# Patient Record
(demographics unavailable — no encounter records)

---

## 2024-12-11 NOTE — REVIEW OF SYSTEMS
[Recent Weight Gain (___ Lbs)] : recent [unfilled] ~Ulb weight gain [Dry Eyes] : dryness of the eyes [As Noted in HPI] : as noted in HPI [Joint Pain] : joint pain [Negative] : Heme/Lymph

## 2024-12-15 NOTE — CONSULT LETTER
[Dear  ___] : Dear  [unfilled], [Consult Letter:] : I had the pleasure of evaluating your patient, [unfilled]. [Please see my note below.] : Please see my note below. [Consult Closing:] : Thank you very much for allowing me to participate in the care of this patient.  If you have any questions, please do not hesitate to contact me. [Sincerely,] : Sincerely, [DrBrigitte  ___] : Dr. YATES [FreeTextEntry3] : Td\par  Myron I. Kleiner, M.D., FACR\par  Chief, Division of Rheumatology\par  Department of Medicine\par  [facility]

## 2024-12-15 NOTE — HISTORY OF PRESENT ILLNESS
[FreeTextEntry1] : NITIN RAMAN is a 55 year old woman who presents for follow up office visit for further evaluation of joint symptoms and rheumatic diseases including rheumatoid arthritis, osteoarthritis, Sjogren's syndrome, and remote history of SLE.  Denies recent joint pain. Last night transient pain right shoulder - resolved. Denies recent Morning stiffness. Some recent dry eyes and dry mouth. Using artificial tears, Biotene mouthwash, and oral hydration with adequate relief. Pt did not restart Celebrex. [facility] oncology discontinued Actemra (last: August 15th, 2024) q.4 weeks, folic acid, Prophylactic aspirin 81 mg and multivitamins. Patient continues calcium and vitamin D supplements and monthly risedronate without side effect or complications. Patient denies rash or side effects with their medications. Pt performed lab tests results yesterday pending results.   [facility] Breast cancer - chemotherapy--Taxol--- also immunotherapy with Keytruda x 3 months--- thrombocytopenia platelets 86,000,--after 2 more months of chemotherapy we will then consider  surgery to remove the breast mass in March 2025  Weight loss 20 lbs  Much fatigue

## 2024-12-15 NOTE — ADDENDUM
[FreeTextEntry1] :  I, Alejandro Levine, acted solely as a scribe for Dr. Myron I. Kleiner, MD. on 12/11/2024. I personally performed the services described in the documentation, reviewed the documentation recorded by the scribe in my presence, and it accurately and completely records my words and actions.

## 2024-12-15 NOTE — HISTORY OF PRESENT ILLNESS
[FreeTextEntry1] : NITIN RAMAN is a 55 year old woman who presents for follow up office visit for further evaluation of joint symptoms and rheumatic diseases including rheumatoid arthritis, osteoarthritis, Sjogren's syndrome, and remote history of SLE.  Denies recent joint pain. Last night transient pain right shoulder - resolved. Denies recent Morning stiffness. Some recent dry eyes and dry mouth. Using artificial tears, Biotene mouthwash, and oral hydration with adequate relief. Pt did not restart Celebrex. [facility] [place]ology discontinued Actemra (last: August 15th, 2024) q.4 weeks, folic acid, Prophylactic aspirin 81 mg and multivitamins. Patient continues calcium and vitamin D supplements and monthly risedronate without side effect or complications. Patient denies rash or side effects with their medications. Pt performed lab tests results yesterday pending results.   [facility] Breast cancer - chemotherapy--Taxol--- also immunotherapy with Keytruda x 3 months--- thrombocytopenia platelets 86,000,--after 2 more months of chemotherapy we will then consider  surgery to remove the breast mass in March 2025  Weight loss 20 lbs  Much fatigue

## 2024-12-15 NOTE — ASSESSMENT
[FreeTextEntry1] : Impression: NITIN RAMAN is a 55 year old woman who presents for follow up office visit for further evaluation of joint symptoms and rheumatic diseases including rheumatoid arthritis, osteoarthritis, Sjogren's syndrome, remote history of SLE.  Denies recent joint pain. Last night transient pain right shoulder Secondary to osteoarthritis and right bicipital tendonitis as seen on exam - resolved. Her rheumatoid arthritis remains quiescent with no evidence of active synovitis on exam today. Denies recent Morning stiffness. From previous physical exam ? mild bilateral synovial proliferation wrist - resolved. On exam pt has flexor tenosynovitis right 3rd finger and right bicipital tendonitis contributing to her joint pain. Some recent dry eyes and dry mouth - on exam pt has dry eyes and tongue dry Secondary to Sjogren Syndrome. Using artificial tears, Biotene mouthwash, and oral hydration with adequate relief. Pt did not restart Celebrex as per instruction from her oncologist. Recent lab tests results from December 6th, 2024 revealed WBC 2.6, hemoglobin 9.7, platelets 86,000 -- with extensive discussion - pt showed me on her cell phone during the office visit. Patient continues calcium and vitamin D supplements and monthly risedronate for general bone protection while on prednisone. [facility] Discontinued Actemra (last: August 15th, 2024) q.4 weeks, and folic acid, and Celebrex/prophylactic aspirin, multivitamin. Patient denies rash or side effects with their medications. Pt performed lab tests results yesterday pending results. Of note, hopefully the chemotherapy for breast cancer will continue to control her joint inflammation secondary to rheumatoid arthritis, as it is doing at this time..  Plan: I reviewed chart and previous records I reviewed previous lab results with the patient with extensive discussion Laboratory tests ordered  see list below  with coordination of care Obtain recent lab tests results  Diagnosis and prognosis discussed Continue current medications (other than those changed below) Patient declines any changes or additions to medication regimen. Artificial tears one drop each eye q.i.d. and p.r.n.(Possible side effects explained) Biotene mouthwash/spray q.i.d. and p.r.n.(Possible side effects explained) Gen-Teal "severe" 1 drop each eye h.s and also 4 times daily and as needed if she can tolerate it (Possible side effects explained) Oral Hydration I request recent PET scan results to be sent to me -- with extensive discussion Return visit 2 months All questions and concerns were addressed.

## 2024-12-15 NOTE — CONSULT LETTER
[Dear  ___] : Dear  [unfilled], [Consult Letter:] : I had the pleasure of evaluating your patient, [unfilled]. [Please see my note below.] : Please see my note below. [Consult Closing:] : Thank you very much for allowing me to participate in the care of this patient.  If you have any questions, please do not hesitate to contact me. [Sincerely,] : Sincerely, [DrBrigitte  ___] : Dr. YATES [FreeTextEntry3] : Td\par  Myron I. Kleiner, M.D., FACR\par  Chief, Division of Rheumatology\par  Department of Medicine\par  [facility]

## 2024-12-15 NOTE — PHYSICAL EXAM
[General Appearance - Alert] : alert [General Appearance - In No Acute Distress] : in no acute distress [General Appearance - Well Nourished] : well nourished [General Appearance - Well Developed] : well developed [General Appearance - Well-Appearing] : healthy appearing [] : normal voice and communication [Sclera] : the sclera and conjunctiva were normal [PERRL With Normal Accommodation] : pupils were equal in size, round, and reactive to light [Extraocular Movements] : extraocular movements were intact [Outer Ear] : the ears and nose were normal in appearance [Oropharynx] : the oropharynx was normal [Neck Appearance] : the appearance of the neck was normal [Neck Cervical Mass (___cm)] : no neck mass was observed [Jugular Venous Distention Increased] : there was no jugular-venous distention [Thyroid Diffuse Enlargement] : the thyroid was not enlarged [Cervical Lymph Nodes Enlarged Posterior Bilaterally] : posterior cervical [Cervical Lymph Nodes Enlarged Anterior Bilaterally] : anterior cervical [Supraclavicular Lymph Nodes Enlarged Bilaterally] : supraclavicular [Axillary Lymph Nodes Enlarged Bilaterally] : axillary [No CVA Tenderness] : no ~M costovertebral angle tenderness [No Spinal Tenderness] : no spinal tenderness [FreeTextEntry1] : Shoulders - tender right bicipital tendons Elbows/Wrists - normal  Hands- early Heberden's nodes, flexor tenosynovitis right 3rd finger, right decreased fist ability,  Hips- bilateral decreased external rotation Knees- bilateral crepitus Ankles- normal Feet-nontender bony enlargement bilateral 1st MTPs

## 2025-02-12 NOTE — HISTORY OF PRESENT ILLNESS
[FreeTextEntry1] : NITIN RAMAN is a 55 year old woman who presents for follow up office visit for further evaluation of joint symptoms and rheumatic diseases including rheumatoid arthritis, osteoarthritis, Sjogren's syndrome, and remote history of SLE.  Patient feels fairly well. Denies recent Morning stiffness. Denies recent joint pain.  She has recent dry eyes and dry mouth which is bothersome. Using artificial tears, Biotene mouthwash, and oral hydration without adequate relief. [facility] oncology discontinued Actemra (last: August 15th, 2024) q.4 weeks, folic acid, Prophylactic aspirin 81 mg and multivitamins. Patient continues calcium and vitamin D supplements and monthly risedronate without side effect or complications. Patient denies rash or side effects with their medications.   [facility] Breast cancer - chemotherapy and immunotherapy

## 2025-02-12 NOTE — ADDENDUM
[FreeTextEntry1] :  I, Alejandro Levine, acted solely as a scribe for Dr. Myron I. Kleiner, MD. on 02/11/2025. I personally performed the services described in the documentation, reviewed the documentation recorded by the scribe in my presence, and it accurately and completely records my words and actions.

## 2025-02-12 NOTE — CONSULT LETTER
[Dear  ___] : Dear  [unfilled], [Consult Letter:] : I had the pleasure of evaluating your patient, [unfilled]. [Please see my note below.] : Please see my note below. [Consult Closing:] : Thank you very much for allowing me to participate in the care of this patient.  If you have any questions, please do not hesitate to contact me. [Sincerely,] : Sincerely, [DrBrigitte  ___] : Dr. YATES [FreeTextEntry3] : Td\par  Myron I. Kleiner, M.D., FACR\par  Chief, Division of Rheumatology\par  Department of Medicine\par  [facility]

## 2025-02-12 NOTE — ASSESSMENT
[FreeTextEntry1] : Impression: NITIN RAMAN is a 55 year old woman who presents for follow up office visit for further evaluation of joint symptoms and rheumatic diseases including osteoarthritis, rheumatoid arthritis, Sjogren's syndrome, remote history of SLE.  Patient feels fairly well. Denies recent Morning stiffness. Denies recent joint pain with her osteoarthritis quiescent. Her rheumatoid arthritis remains quiescent with no evidence of active synovitis on exam today. From previous physical exam right bicipital tendonitis - resolved. On exam pt has flexor tenosynovitis right 3rd finger contributing to her joint pain. Some recent dry eyes and dry mouth - on exam pt has dry eyes and tongue slightly moist Secondary to Sjogren Syndrome. Using artificial tears, Biotene mouthwash, and oral hydration without adequate relief. Recent lab tests results revealed + Anti-Ro/La antibody, WBC 2.01, hemoglobin 10.2, platelets 72,000. RF 80 (0-13), Anti-, otherwise unrevealing-- with extensive discussion. Patient continues calcium and vitamin D supplements and monthly risedronate for general bone protection while on prednisone. [facility] had discontinued Actemra (last: August 15th, 2024) q.4 weeks, folic acid, prophylactic aspirin and multivitamin. Patient denies rash or side effects with their medications. Of note, hopefully the chemotherapy for breast cancer will continue to control her joint inflammation secondary to rheumatoid arthritis, as it is doing at this time..  Plan: I reviewed chart and previous records I reviewed previous lab results with the patient with extensive discussion Laboratory tests ordered  see list below  with coordination of care Obtain recent lab tests results  Diagnosis and prognosis discussed Continue current medications (other than those changed below) If ok with oncology start Pilocarpine 5 mg b.i.d.; if no better/no side effects 7 days, increase t.i.d.(Possible side effects explained)--- extensive discussion Artificial tears one drop each eye q.i.d. and p.r.n.(Possible side effects explained) Biotene mouthwash/spray q.i.d. and p.r.n.(Possible side effects explained) BioXtra mouthwash/spray q.i.d. and p.r.n. (Possible side effects explained) Gen-Teal "severe" 1 drop each eye h.s and also 4 times daily and as needed if she can tolerate it (Possible side effects explained) Oral Hydration Follow-up with oncology regarding her leukopenia and thrombocytopenia--extensive discussion Return visit 2 months All questions and concerns were addressed. Total time for this office visit, including face-to-face time and non-face-to-face time, 71 minutes--- including review of the chart and previous records, detailed review of her medical history, review of previous lab results with extensive discussion with the patient, ordering lab tests with coordination of care, review of previous imaging reports/x-ray results, detailed medication history, review of medications going forward with their possible side effects, reviewed modalities for treatment of her dryness with extensive discussion

## 2025-04-05 NOTE — HISTORY OF PRESENT ILLNESS
[FreeTextEntry1] : NITIN RAMAN is a 55 year old woman who presents for follow up office visit for further evaluation of joint symptoms and rheumatic diseases including rheumatoid arthritis, osteoarthritis, Sjogren's syndrome, and remote history of SLE. Accompanied by her  Tan   Denies recent joint pain. Denies recent morning stiffness. Pt notes recent very dry eyes and dry mouth using artificial tears, biotene mouthwash, oral hydration, and Pilocarpine 5mg b.i.d without relief.  Patient has been having blurry vision which ophthalmology diagnosed secondary to her very dry eyes.  Ophthalmology plugged bilateral lacrimal tear ducts and treated with topical ophthalmic antibiotic for corneal abrasion and different artificial tears including xiidra using artificial tears.  She has used GenTeal artificial tears without relief.  Patient continues calcium and vitamin D supplements and monthly risedronate without side effect or complications.  Patient denies rash or side effects with current medications. Patient is content with current medication regimen.   [facility]  Breast cancer--completed chemotherapy; April 22nd pt to have left mastectomy; depending on the findings at surgery, she may also then get RT; but she is continuing Keytruda 2 weeks ago--pt notes recent Hospitalization at [facility] secondary to recent fever and chills, treated with antibiotics and blood transfusion --no source of infection was found--improved

## 2025-04-05 NOTE — ADDENDUM
[FreeTextEntry1] :  I, Nestor Ruiz, acted solely as a scribe for Dr. Myron I. Kleiner, MD. on 04/04/2025. I personally performed the services described in the documentation, reviewed the documentation recorded by the scribe in my presence, and it accurately and completely records my words and actions.

## 2025-04-05 NOTE — PHYSICAL EXAM
[General Appearance - Alert] : alert [General Appearance - In No Acute Distress] : in no acute distress [General Appearance - Well Nourished] : well nourished [General Appearance - Well Developed] : well developed [General Appearance - Well-Appearing] : healthy appearing [] : normal voice and communication [Sclera] : the sclera and conjunctiva were normal [PERRL With Normal Accommodation] : pupils were equal in size, round, and reactive to light [Extraocular Movements] : extraocular movements were intact [Outer Ear] : the ears and nose were normal in appearance [Oropharynx] : the oropharynx was normal [Neck Appearance] : the appearance of the neck was normal [Neck Cervical Mass (___cm)] : no neck mass was observed [Jugular Venous Distention Increased] : there was no jugular-venous distention [Thyroid Diffuse Enlargement] : the thyroid was not enlarged [Cervical Lymph Nodes Enlarged Posterior Bilaterally] : posterior cervical [Cervical Lymph Nodes Enlarged Anterior Bilaterally] : anterior cervical [Supraclavicular Lymph Nodes Enlarged Bilaterally] : supraclavicular [Axillary Lymph Nodes Enlarged Bilaterally] : axillary [No CVA Tenderness] : no ~M costovertebral angle tenderness [No Spinal Tenderness] : no spinal tenderness [FreeTextEntry1] : Shoulders/Elbows/Wrists - normal  Hands- early Heberden's nodes, flexor tenosynovitis right 3rd finger, right decreased fist ability,  Hips- bilateral decreased external rotation Knees- bilateral crepitus Ankles- normal Feet-nontender bony enlargement bilateral 1st MTPs

## 2025-04-05 NOTE — ASSESSMENT
[FreeTextEntry1] : Impression: NITIN RAMAN is a 55 year old woman who presents for follow up office visit for further evaluation of joint symptoms and rheumatic diseases including osteoarthritis, rheumatoid arthritis, Sjogren's syndrome, remote history of SLE, accompanied by her  Tan.   Denies recent joint pain, osteoarthritis quiescent. Denies recent morning stiffness. Her rheumatoid arthritis remains quiescent with no evidence of active synovitis on exam today. On exam pt has flexor tenosynovitis right 3rd finger contributing to her joint pain.  Pt notes recent very dry eyes and dry mouth, on PE pt has dry eyes and dry tongue secondary to Sjogren's Syndrome -- using artificial tears including GenTeal topical ophthalmic, biotene mouthwash, oral hydration, and Pilocarpine 5mg b.i.d without relief.  Ophthalmologist treated with plugging bilateral lacrimal tear ducts.  She had a corneal abrasion treated by ophthalmology with topical ophthalmic antibiotic.  Recent lab results reveal Hemoglobin 9.9, Anti , RF 34 (0-13), ESR 31, otherwise unrevealing -- With extensive discussion. Patient continues calcium and vitamin D supplements and monthly risedronate without side effect or complications for general bone protection while on prednisone.  Patient denies rash or side effects with current medications. Patient is content with current medication regimen.   Plan: I reviewed chart and previous records I reviewed previous lab results with the patient and her  with extensive discussion Obtain recent lab tests results  Diagnosis and prognosis discussed Continue current medications (other than those changed below) Increase Pilocarpine 7.5mg q.i.d; and--while waiting for the new prescription, before getting the 7.5 mg tablets pt should take 10mg b.i.d end of breakfast and supper and 5mg end of lunch and h.s  (Possible side effects explained)   Patient declines any changes or additions to medication regimen.  Artificial tears one drop each eye q.i.d. and p.r.n.(Possible side effects explained) Biotene mouthwash/spray q.i.d. and p.r.n.(Possible side effects explained) BioXtra mouthwash/spray q.i.d. and p.r.n. (Possible side effects explained) Gen-Teal "severe" 1 drop each eye h.s and also 4 times daily and as needed if she can tolerate it (Possible side effects explained) Oral Hydration Return visit 2 months All questions and concerns were addressed.

## 2025-04-05 NOTE — CONSULT LETTER
[Dear  ___] : Dear  [unfilled], [Consult Letter:] : I had the pleasure of evaluating your patient, [unfilled]. [Please see my note below.] : Please see my note below. [Consult Closing:] : Thank you very much for allowing me to participate in the care of this patient.  If you have any questions, please do not hesitate to contact me. [Sincerely,] : Sincerely, [DrBrigitte  ___] : Dr. YATES [FreeTextEntry3] : Td\par  Myron I. Kleiner, M.D., FACR\par  Chief, Division of Rheumatology\par  Department of Medicine\par  [facility]

## 2025-06-12 NOTE — REVIEW OF SYSTEMS
[Dry Eyes] : dryness of the eyes [As Noted in HPI] : as noted in HPI [Joint Pain] : joint pain [Negative] : Genitourinary

## 2025-06-14 NOTE — PHYSICAL EXAM
[General Appearance - Alert] : alert [General Appearance - In No Acute Distress] : in no acute distress [General Appearance - Well Nourished] : well nourished [General Appearance - Well Developed] : well developed [General Appearance - Well-Appearing] : healthy appearing [Sclera] : the sclera and conjunctiva were normal [PERRL With Normal Accommodation] : pupils were equal in size, round, and reactive to light [Extraocular Movements] : extraocular movements were intact [Outer Ear] : the ears and nose were normal in appearance [Oropharynx] : the oropharynx was normal [Neck Appearance] : the appearance of the neck was normal [Neck Cervical Mass (___cm)] : no neck mass was observed [Jugular Venous Distention Increased] : there was no jugular-venous distention [Thyroid Diffuse Enlargement] : the thyroid was not enlarged [Lungs Percussion] : the lungs were normal to percussion [Heart Rate And Rhythm] : heart rate was normal and rhythm regular [Edema] : there was no peripheral edema [Abdomen Soft] : soft [Abdomen Tenderness] : non-tender [Abdomen Mass (___ Cm)] : no abdominal mass palpated [Cervical Lymph Nodes Enlarged Posterior Bilaterally] : posterior cervical [Cervical Lymph Nodes Enlarged Anterior Bilaterally] : anterior cervical [Supraclavicular Lymph Nodes Enlarged Bilaterally] : supraclavicular [Axillary Lymph Nodes Enlarged Bilaterally] : axillary [No CVA Tenderness] : no ~M costovertebral angle tenderness [No Spinal Tenderness] : no spinal tenderness [Skin Color & Pigmentation] : normal skin color and pigmentation [Skin Turgor] : normal skin turgor [] : no rash [Cranial Nerves] : cranial nerves 2-12 were intact [Sensation] : the sensory exam was normal to light touch and pinprick [Motor Exam] : the motor exam was normal [Oriented To Time, Place, And Person] : oriented to person, place, and time [Impaired Insight] : insight and judgment were intact [Affect] : the affect was normal [Mood] : the mood was normal [Oriented to Person] : oriented to person [Oriented to Place] : oriented to place [Oriented to Time] : oriented to time [FreeTextEntry1] : Shoulders/Elbows/Wrists - normal Hands- early Heberden's nodes, flexor tenosynovitis right 3rd finger, right decreased fist ability, Hips- bilateral decreased external rotation Knees- bilateral crepitus Ankles- normal Feet-nontender bony enlargement bilateral 1st MTPs.

## 2025-06-14 NOTE — CONSULT LETTER
[Dear  ___] : Dear  [unfilled], [Consult Letter:] : I had the pleasure of evaluating your patient, [unfilled]. [Please see my note below.] : Please see my note below. [Consult Closing:] : Thank you very much for allowing me to participate in the care of this patient.  If you have any questions, please do not hesitate to contact me. [Sincerely,] : Sincerely, [FreeTextEntry3] : Myron Myron I. Kleiner, M.D., FACR Chief, Division of Rheumatology Department of Medicine [facility] [DrBrigitte  ___] : Dr. YATES

## 2025-06-14 NOTE — ASSESSMENT
[FreeTextEntry1] : Impression: NITIN RAMAN is a 56 year old woman who presents for follow up office visit for further evaluation of joint symptoms and rheumatic diseases including osteoarthritis, rheumatoid arthritis, Sjogren's syndrome, remote history of SLE  Recent morning stiffness 20-25 minutes. Pt notes recent joint pains in ankles last night. She notes very dry eyes and dry mouth secondary to Sjogrens Syndrome - using artificial tears, biotene mouthwash, oral hydration, Cequa (as per her ophthalmologist; cyclosporine) and Pilocarpine 5mg 4 times daily with adequate relief of her eyes but mild to no relief of dry mouth. She states she gets nauseous when she takes Pilocarpine initially so she would not like to increase it. Her rheumatoid arthritis remains quiescent with no evidence of active synovitis on exam today. Patient continues calcium and vitamin D supplements and monthly risedronate without side effect or complications. Patient denies rash or side effects with current medications. Patient is content with current medication regimen.   Plan: I reviewed chart and previous records I reviewed previous lab results with the patient with extensive discussion Obtain recent lab tests results X-Rays ordered as below--- with coordination of care Chest X-Ray/Cat scan report to be obtained--she will have it sent to me Diagnosis and prognosis discussed Continue current medications (other than those changed below) Patient declines any changes or additions to medication regimen. Tylenol 1000 mg t.i.d. p.r.n. joint pain or Tylenol 1300 mg b.i.d. p.r.n. joint pain (possible side effects explained) Artificial tears one drop each eye q.i.d. and p.r.n.(Possible side effects explained) Biotene mouthwash/spray q.i.d. and p.r.n.(Possible side effects explained) BioXtra mouthwash/spray q.i.d. and p.r.n. (Possible side effects explained) Oral Hydration Return visit 2 months All questions and concerns were addressed.

## 2025-06-14 NOTE — END OF VISIT
[FreeTextEntry3] : I, Hafsa Lee, acted solely as a scribe for Dr. Myron I. Kleiner, MD. on 06/12/2025. I personally performed the services described in the documentation, reviewed the documentation recorded by the scribe in my presence, and it accurately and completely records my words and actions.  [Time Spent: ___ minutes] : I have spent [unfilled] minutes of time on the encounter which excludes teaching and separately reported services.

## 2025-06-14 NOTE — HISTORY OF PRESENT ILLNESS
[FreeTextEntry1] : NITIN RAMAN is a 55 year old woman who presents for follow up office visit for further evaluation of joint symptoms and rheumatic diseases including rheumatoid arthritis, osteoarthritis, Sjogren's syndrome, and remote history of SLE.  Recent morning stiffness 20-25 minutes. Pt notes recent joint pains in ankles last night. She notes very dry eyes and dry mouth using artificial tears, biotene mouthwash, oral hydration, Cequa (as per her ophthalmologist; cyclosporine) and Pilocarpine 5mg b.i.d with adequate relief of her eyes but mild to no relief of dry mouth. She states she gets nauseous when she takes Pilocarpine initially so she would not like to increase it. Patient continues calcium and vitamin D supplements and monthly risedronate without side effect or complications. Patient denies rash or side effects with current medications. Patient is content with current medication regimen.  PMH Breast cancer--completed chemotherapy; April 22nd left mastectomy; she will have Proton RT, she is continuing Keytruda and will start chemo pill previous Hospitlization in March at [facility] secondary to recent fever and chills, treated with antibiotics and blood transfusion --no source of infection was found--improved  SH Pt is back to work full time

## 2025-06-14 NOTE — CONSULT LETTER
[Dear  ___] : Dear  [unfilled], [Consult Letter:] : I had the pleasure of evaluating your patient, [unfilled]. [Please see my note below.] : Please see my note below. [Consult Closing:] : Thank you very much for allowing me to participate in the care of this patient.  If you have any questions, please do not hesitate to contact me. show [Sincerely,] : Sincerely, [FreeTextEntry3] : Myron Myron I. Kleiner, M.D., FACR Chief, Division of Rheumatology Department of Medicine [facility] [DrBrigitte  ___] : Dr. YATES

## 2025-06-14 NOTE — HISTORY OF PRESENT ILLNESS
[FreeTextEntry1] : NITIN RAMAN is a 55 year old woman who presents for follow up office visit for further evaluation of joint symptoms and rheumatic diseases including rheumatoid arthritis, osteoarthritis, Sjogren's syndrome, and remote history of SLE.  Recent morning stiffness 20-25 minutes. Pt notes recent joint pains in ankles last night. She notes very dry eyes and dry mouth using artificial tears, biotene mouthwash, oral hydration, Cequa (as per her ophthalmologist; cyclosporine) and Pilocarpine 5mg b.i.d with adequate relief of her eyes but mild to no relief of dry mouth. She states she gets nauseous when she takes Pilocarpine initially so she would not like to increase it. Patient continues calcium and vitamin D supplements and monthly risedronate without side effect or complications. Patient denies rash or side effects with current medications. Patient is content with current medication regimen.  PMH Breast cancer--completed chemotherapy; April 22nd left mastectomy; she will have Proton RT, she is continuing Keytruda and will start chemo pill previous Hospitlization in March at [facility] secondary to recent fever and chills, treated with antibiotics and blood transfusion --no source of infection was found--improved  SH Pt is back to work full time